# Patient Record
Sex: FEMALE | Race: WHITE | NOT HISPANIC OR LATINO
[De-identification: names, ages, dates, MRNs, and addresses within clinical notes are randomized per-mention and may not be internally consistent; named-entity substitution may affect disease eponyms.]

---

## 2018-08-10 ENCOUNTER — RESULT REVIEW (OUTPATIENT)
Age: 43
End: 2018-08-10

## 2018-12-21 PROBLEM — Z00.00 ENCOUNTER FOR PREVENTIVE HEALTH EXAMINATION: Status: ACTIVE | Noted: 2018-12-21

## 2021-01-20 ENCOUNTER — APPOINTMENT (OUTPATIENT)
Dept: OBGYN | Facility: CLINIC | Age: 46
End: 2021-01-20
Payer: COMMERCIAL

## 2021-01-20 VITALS
TEMPERATURE: 97.1 F | WEIGHT: 154 LBS | SYSTOLIC BLOOD PRESSURE: 100 MMHG | DIASTOLIC BLOOD PRESSURE: 70 MMHG | BODY MASS INDEX: 29.07 KG/M2 | HEIGHT: 61 IN

## 2021-01-20 DIAGNOSIS — Z78.9 OTHER SPECIFIED HEALTH STATUS: ICD-10-CM

## 2021-01-20 DIAGNOSIS — Z87.898 PERSONAL HISTORY OF OTHER SPECIFIED CONDITIONS: ICD-10-CM

## 2021-01-20 DIAGNOSIS — Z01.419 ENCOUNTER FOR GYNECOLOGICAL EXAMINATION (GENERAL) (ROUTINE) W/OUT ABNORMAL FINDINGS: ICD-10-CM

## 2021-01-20 DIAGNOSIS — Z80.3 FAMILY HISTORY OF MALIGNANT NEOPLASM OF BREAST: ICD-10-CM

## 2021-01-20 PROCEDURE — 99396 PREV VISIT EST AGE 40-64: CPT

## 2021-01-20 PROCEDURE — 99072 ADDL SUPL MATRL&STAF TM PHE: CPT

## 2021-01-20 RX ORDER — LAMOTRIGINE 150 MG/1
TABLET ORAL
Refills: 0 | Status: ACTIVE | COMMUNITY

## 2021-01-20 NOTE — PHYSICAL EXAM
[Appropriately responsive] : appropriately responsive [Alert] : alert [No Acute Distress] : no acute distress [No Lymphadenopathy] : no lymphadenopathy [Soft] : soft [Non-tender] : non-tender [Oriented x3] : oriented x3 [Non-distended] : non-distended [Examination Of The Breasts] : a normal appearance [No Discharge] : no discharge [No Masses] : no breast masses were palpable [Labia Majora] : normal [Labia Minora] : normal [No Bleeding] : There was no active vaginal bleeding [Normal] : normal [Normal Position] : in a normal position [Uterine Adnexae] : non-palpable [FreeTextEntry7] : 2  incision

## 2021-01-20 NOTE — REVIEW OF SYSTEMS
[Patient Intake Form Reviewed] : Patient intake form was reviewed [Negative] : Heme/Lymph [Fever] : no fever [Chills] : no chills [Dyspnea] : no dyspnea [Chest Pain] : no chest pain [Abdominal Pain] : no abdominal pain [Abn Vaginal bleeding] : no abnormal vaginal bleeding [Pelvic pain] : no pelvic pain [Headache] : no headache [Depression] : no depression

## 2021-01-20 NOTE — DISCUSSION/SUMMARY
[FreeTextEntry1] :   The benefits of adequate calcium intake and a daily multivitamin along with routine daily cardiovascular exercise were reviewed with the patient.\par   The patient was informed regarding the benefits of a screening colonoscopy.\par   The importance of safer-sex was discussed with the patient.\par We reviewed ASCCP/ACOG guidelines for pap smears.

## 2021-01-20 NOTE — HISTORY OF PRESENT ILLNESS
[Patient reported PAP Smear was normal] : Patient reported PAP Smear was normal [Menarche Age ____] : age at menarche was [unfilled] [Definite ___ (Date)] : the last menstrual period was [unfilled] [Irregular Cycle Intervals] : are  irregular [Monogamous (Male Partner)] : is monogamous with a male partner [Y] : Positive pregnancy history [Hot Flashes] : hot flashes [Night Sweats] : night sweats [Menarche Age: ____] : age at menarche was [unfilled] [No] : Patient does not have concerns regarding sex [Currently Active] : currently active [Men] : men [Vaginal] : vaginal [Yes] : Yes [Condoms] : Condoms [Patient refuses STI testing] : Patient refuses STI testing [TextBox_4] : Viki is a 45  LMP 11/15/20 who presents for annual exam. She is without complaints. Denies pelvic pain, abnormal bleeding, or vaginal discharge. Denies issues with bowel or bladder function. \par She has not had a period for a few months and is having some hot flashes. \par She is sexually active with 1 male partner (s). Denies pain with intercourse. She is sexually satisfied. \par Lives with family. Works as . Feels safe at home. Denies depression/abuse. \par Immunizations: Received flu shot. \par  [Mammogramdate] : 12/12/2018 [TextBox_19] : BR2 [BreastSonogramDate] : 12/12/2018 [TextBox_25] : BR2 [PapSmeardate] : 08/10/2018 [TextBox_31] : NEG [LMPDate] : 11/15/2020 [PGHxTotal] : 2 [[facility]] : 2 [[facility]] : 2 [FreeTextEntry1] : 11/15/2020

## 2021-10-01 ENCOUNTER — EMERGENCY (EMERGENCY)
Facility: HOSPITAL | Age: 46
LOS: 0 days | Discharge: HOME | End: 2021-10-01
Attending: EMERGENCY MEDICINE
Payer: COMMERCIAL

## 2021-10-01 VITALS
OXYGEN SATURATION: 96 % | SYSTOLIC BLOOD PRESSURE: 115 MMHG | HEART RATE: 80 BPM | WEIGHT: 130.07 LBS | RESPIRATION RATE: 17 BRPM | HEIGHT: 61 IN | DIASTOLIC BLOOD PRESSURE: 72 MMHG | TEMPERATURE: 97 F

## 2021-10-01 VITALS
DIASTOLIC BLOOD PRESSURE: 66 MMHG | SYSTOLIC BLOOD PRESSURE: 111 MMHG | RESPIRATION RATE: 18 BRPM | HEART RATE: 90 BPM | OXYGEN SATURATION: 98 %

## 2021-10-01 DIAGNOSIS — G40.909 EPILEPSY, UNSPECIFIED, NOT INTRACTABLE, WITHOUT STATUS EPILEPTICUS: ICD-10-CM

## 2021-10-01 DIAGNOSIS — R51.9 HEADACHE, UNSPECIFIED: ICD-10-CM

## 2021-10-01 DIAGNOSIS — Z20.822 CONTACT WITH AND (SUSPECTED) EXPOSURE TO COVID-19: ICD-10-CM

## 2021-10-01 DIAGNOSIS — R56.9 UNSPECIFIED CONVULSIONS: ICD-10-CM

## 2021-10-01 LAB
ALBUMIN SERPL ELPH-MCNC: 3.8 G/DL — SIGNIFICANT CHANGE UP (ref 3.3–5)
ALP SERPL-CCNC: 72 U/L — SIGNIFICANT CHANGE UP (ref 40–120)
ALT FLD-CCNC: 25 U/L — SIGNIFICANT CHANGE UP (ref 12–78)
AMMONIA BLD-MCNC: <10 UMOL/L — LOW (ref 11–32)
AMPHET UR-MCNC: NEGATIVE — SIGNIFICANT CHANGE UP
ANION GAP SERPL CALC-SCNC: 2 MMOL/L — LOW (ref 5–17)
AST SERPL-CCNC: 14 U/L — LOW (ref 15–37)
BARBITURATES UR SCN-MCNC: NEGATIVE — SIGNIFICANT CHANGE UP
BASOPHILS # BLD AUTO: 0.03 K/UL — SIGNIFICANT CHANGE UP (ref 0–0.2)
BASOPHILS NFR BLD AUTO: 0.4 % — SIGNIFICANT CHANGE UP (ref 0–2)
BENZODIAZ UR-MCNC: NEGATIVE — SIGNIFICANT CHANGE UP
BILIRUB SERPL-MCNC: 0.4 MG/DL — SIGNIFICANT CHANGE UP (ref 0.2–1.2)
BUN SERPL-MCNC: 19 MG/DL — SIGNIFICANT CHANGE UP (ref 7–23)
CALCIUM SERPL-MCNC: 8.4 MG/DL — LOW (ref 8.5–10.1)
CHLORIDE SERPL-SCNC: 107 MMOL/L — SIGNIFICANT CHANGE UP (ref 96–108)
CO2 SERPL-SCNC: 32 MMOL/L — HIGH (ref 22–31)
COCAINE METAB.OTHER UR-MCNC: NEGATIVE — SIGNIFICANT CHANGE UP
CREAT SERPL-MCNC: 0.81 MG/DL — SIGNIFICANT CHANGE UP (ref 0.5–1.3)
EOSINOPHIL # BLD AUTO: 0.05 K/UL — SIGNIFICANT CHANGE UP (ref 0–0.5)
EOSINOPHIL NFR BLD AUTO: 0.6 % — SIGNIFICANT CHANGE UP (ref 0–6)
ETHANOL SERPL-MCNC: <10 MG/DL — SIGNIFICANT CHANGE UP (ref 0–10)
GLUCOSE BLDC GLUCOMTR-MCNC: 94 MG/DL — SIGNIFICANT CHANGE UP (ref 70–99)
GLUCOSE SERPL-MCNC: 104 MG/DL — HIGH (ref 70–99)
HCG SERPL-ACNC: 1 MIU/ML — SIGNIFICANT CHANGE UP
HCT VFR BLD CALC: 38.1 % — SIGNIFICANT CHANGE UP (ref 34.5–45)
HGB BLD-MCNC: 12.7 G/DL — SIGNIFICANT CHANGE UP (ref 11.5–15.5)
IMM GRANULOCYTES NFR BLD AUTO: 0.3 % — SIGNIFICANT CHANGE UP (ref 0–1.5)
LACTATE SERPL-SCNC: 1.1 MMOL/L — SIGNIFICANT CHANGE UP (ref 0.7–2)
LYMPHOCYTES # BLD AUTO: 0.88 K/UL — LOW (ref 1–3.3)
LYMPHOCYTES # BLD AUTO: 11.2 % — LOW (ref 13–44)
MCHC RBC-ENTMCNC: 31.1 PG — SIGNIFICANT CHANGE UP (ref 27–34)
MCHC RBC-ENTMCNC: 33.3 GM/DL — SIGNIFICANT CHANGE UP (ref 32–36)
MCV RBC AUTO: 93.2 FL — SIGNIFICANT CHANGE UP (ref 80–100)
METHADONE UR-MCNC: NEGATIVE — SIGNIFICANT CHANGE UP
MONOCYTES # BLD AUTO: 0.38 K/UL — SIGNIFICANT CHANGE UP (ref 0–0.9)
MONOCYTES NFR BLD AUTO: 4.8 % — SIGNIFICANT CHANGE UP (ref 2–14)
NEUTROPHILS # BLD AUTO: 6.5 K/UL — SIGNIFICANT CHANGE UP (ref 1.8–7.4)
NEUTROPHILS NFR BLD AUTO: 82.7 % — HIGH (ref 43–77)
NRBC # BLD: 0 /100 WBCS — SIGNIFICANT CHANGE UP (ref 0–0)
OPIATES UR-MCNC: NEGATIVE — SIGNIFICANT CHANGE UP
PCP SPEC-MCNC: SIGNIFICANT CHANGE UP
PCP UR-MCNC: NEGATIVE — SIGNIFICANT CHANGE UP
PLATELET # BLD AUTO: 263 K/UL — SIGNIFICANT CHANGE UP (ref 150–400)
POTASSIUM SERPL-MCNC: 4 MMOL/L — SIGNIFICANT CHANGE UP (ref 3.5–5.3)
POTASSIUM SERPL-SCNC: 4 MMOL/L — SIGNIFICANT CHANGE UP (ref 3.5–5.3)
PROT SERPL-MCNC: 7.4 GM/DL — SIGNIFICANT CHANGE UP (ref 6–8.3)
RBC # BLD: 4.09 M/UL — SIGNIFICANT CHANGE UP (ref 3.8–5.2)
RBC # FLD: 13.1 % — SIGNIFICANT CHANGE UP (ref 10.3–14.5)
SODIUM SERPL-SCNC: 141 MMOL/L — SIGNIFICANT CHANGE UP (ref 135–145)
THC UR QL: NEGATIVE — SIGNIFICANT CHANGE UP
WBC # BLD: 7.86 K/UL — SIGNIFICANT CHANGE UP (ref 3.8–10.5)
WBC # FLD AUTO: 7.86 K/UL — SIGNIFICANT CHANGE UP (ref 3.8–10.5)

## 2021-10-01 PROCEDURE — 93010 ELECTROCARDIOGRAM REPORT: CPT

## 2021-10-01 PROCEDURE — 99285 EMERGENCY DEPT VISIT HI MDM: CPT

## 2021-10-01 RX ORDER — LAMOTRIGINE 25 MG/1
200 TABLET, ORALLY DISINTEGRATING ORAL ONCE
Refills: 0 | Status: COMPLETED | OUTPATIENT
Start: 2021-10-01 | End: 2021-10-01

## 2021-10-01 RX ORDER — ACETAMINOPHEN 500 MG
650 TABLET ORAL ONCE
Refills: 0 | Status: COMPLETED | OUTPATIENT
Start: 2021-10-01 | End: 2021-10-01

## 2021-10-01 RX ADMIN — LAMOTRIGINE 200 MILLIGRAM(S): 25 TABLET, ORALLY DISINTEGRATING ORAL at 23:54

## 2021-10-01 RX ADMIN — Medication 650 MILLIGRAM(S): at 23:54

## 2021-10-01 NOTE — ED PROVIDER NOTE - OBJECTIVE STATEMENT
46 yo F with seizure.  Pt. was on return trip on plane from [place] when she started to seize, tonic/clonic, whole body, lasted minutes.  Pt. came to ER thereafter, complaining of headache.  No other complaints.  Pt. is well now.  Notes that her last grand mal was 20 years ago, but she occasionally (a few times a year), has focal/partial seizures.  She missed a few doses of Lamictal while on vacation as well.   ROS: negative for fever, cough, chest pain, shortness of breath, abd pain, nausea, vomiting, diarrhea, rash, paresthesia, and weakness--all other systems reviewed are negative.   PMH: epilepsy; Meds: lamictal; SH: Denies smoking/drinking/drug use

## 2021-10-01 NOTE — ED ADULT NURSE NOTE - CHIEF COMPLAINT QUOTE
had 2 sz in the plane from [facility] on Lamictal 225mg at pm and 200mg in the Am fs 108

## 2021-10-01 NOTE — ED PROVIDER NOTE - CLINICAL SUMMARY MEDICAL DECISION MAKING FREE TEXT BOX
46 yo F with seizure, hx of seizures, back to baseline now, no head trauma, has meds at home, missed dosages  -lamictal, tylenol for headache, basic labs, ammonia, hcg, lactate, drug/etoh, sz precautions, monitor  -f/u results, reeval

## 2021-10-01 NOTE — ED ADULT TRIAGE NOTE - CHIEF COMPLAINT QUOTE
had 2 sz in the plane from [facility] on Lamictal 225mg at pm and 200mg in the Am fs 108

## 2021-10-01 NOTE — ED PROVIDER NOTE - CARE PROVIDER_API CALL
Julian Cordova)  Clinical Neurophysiology; Neurology  [address]  [place] [address]  Phone: [phone]  Fax: [phone]  Follow Up Time: 4-6 Days

## 2021-10-01 NOTE — ED ADULT NURSE NOTE - CHPI ED NUR SYMPTOMS NEG
no blurred vision/no change in level of consciousness/no fever/no loss of consciousness/no nausea/no numbness/no vomiting/no weakness

## 2021-10-01 NOTE — ED PROVIDER NOTE - PHYSICAL EXAMINATION
Vitals: WNL  Gen: AAOx3, NAD, sitting comfortably in stretcher  Head: ncat, perrla, eomi b/l  Neck: supple, no lymphadenopathy, no midline deviation  Heart: rrr, no m/r/g  Lungs: CTA b/l, no rales/ronchi/wheezes  Abd: soft, nontender, non-distended, no rebound or guarding  Ext: no clubbing/cyanosis/edema  Neuro: sensation and muscle strength intact b/l, steady gait, CN2-12 intact b/l, no focal weakness or sensory loss

## 2021-10-01 NOTE — ED ADULT NURSE NOTE - OBJECTIVE STATEMENT
Pt is a 44 yo F, AOx4 hx of seizure had 2 sz in the plane denies falling or hitting head, from [place] on Lamictal 225mg at pm and 200mg in the Am fs 108, denies pregnancy, denies allergy.

## 2021-10-01 NOTE — ED PROVIDER NOTE - PATIENT PORTAL LINK FT
You can access the FollowMyHealth Patient Portal offered by [facility] by registering at the following website: [email or web address]. By joining [facility]’s FollowMyHealth portal, you will also be able to view your health information using other applications (apps) compatible with our system.

## 2021-10-01 NOTE — ED PROVIDER NOTE - PROGRESS NOTE DETAILS
Results reported to patient--grossly benign, labs unremarkable   Pt. reports feeling better after meds, no sz activity in ER  pt. agrees to f/u with primary care outpt. as well as private neuro appt. already scheduled in 10 days   pt. understands to return to ED if symptoms worsen; will d/c; pt. has Lamictal at home, will resume normal schedule in AM

## 2021-10-02 LAB
APPEARANCE UR: ABNORMAL
BACTERIA # UR AUTO: ABNORMAL
BILIRUB UR-MCNC: NEGATIVE — SIGNIFICANT CHANGE UP
COLOR SPEC: YELLOW — SIGNIFICANT CHANGE UP
DIFF PNL FLD: ABNORMAL
EPI CELLS # UR: ABNORMAL
GLUCOSE UR QL: NEGATIVE MG/DL — SIGNIFICANT CHANGE UP
HYALINE CASTS # UR AUTO: ABNORMAL /LPF
KETONES UR-MCNC: ABNORMAL
LEUKOCYTE ESTERASE UR-ACNC: ABNORMAL
NITRITE UR-MCNC: NEGATIVE — SIGNIFICANT CHANGE UP
PH UR: 6 — SIGNIFICANT CHANGE UP (ref 5–8)
PROT UR-MCNC: 15 MG/DL
RAPID RVP RESULT: SIGNIFICANT CHANGE UP
RBC CASTS # UR COMP ASSIST: ABNORMAL /HPF (ref 0–4)
SARS-COV-2 RNA SPEC QL NAA+PROBE: SIGNIFICANT CHANGE UP
SP GR SPEC: 1.02 — SIGNIFICANT CHANGE UP (ref 1.01–1.02)
UROBILINOGEN FLD QL: NEGATIVE MG/DL — SIGNIFICANT CHANGE UP
WBC UR QL: ABNORMAL

## 2021-10-04 LAB
CULTURE RESULTS: SIGNIFICANT CHANGE UP
SPECIMEN SOURCE: SIGNIFICANT CHANGE UP

## 2021-10-05 LAB — LAMOTRIGINE SERPL-MCNC: 4.3 UG/ML — SIGNIFICANT CHANGE UP (ref 2–20)

## 2022-03-09 PROBLEM — R56.9 UNSPECIFIED CONVULSIONS: Chronic | Status: ACTIVE | Noted: 2021-10-01

## 2022-04-26 ENCOUNTER — NON-APPOINTMENT (OUTPATIENT)
Age: 47
End: 2022-04-26

## 2022-04-27 ENCOUNTER — APPOINTMENT (OUTPATIENT)
Dept: OBGYN | Facility: CLINIC | Age: 47
End: 2022-04-27
Payer: COMMERCIAL

## 2022-04-27 VITALS
BODY MASS INDEX: 26.06 KG/M2 | WEIGHT: 138 LBS | SYSTOLIC BLOOD PRESSURE: 118 MMHG | DIASTOLIC BLOOD PRESSURE: 82 MMHG | HEIGHT: 61 IN

## 2022-04-27 DIAGNOSIS — Z12.31 ENCOUNTER FOR SCREENING MAMMOGRAM FOR MALIGNANT NEOPLASM OF BREAST: ICD-10-CM

## 2022-04-27 DIAGNOSIS — R92.2 INCONCLUSIVE MAMMOGRAM: ICD-10-CM

## 2022-04-27 PROCEDURE — 82270 OCCULT BLOOD FECES: CPT

## 2022-04-27 PROCEDURE — 99396 PREV VISIT EST AGE 40-64: CPT

## 2022-04-28 LAB
DATE COLLECTED: NORMAL
HEMOCCULT SP1 STL QL: NEGATIVE
QUALITY CONTROL: YES

## 2022-04-29 LAB — HPV HIGH+LOW RISK DNA PNL CVX: NOT DETECTED

## 2022-04-29 NOTE — PHYSICAL EXAM
[Chaperone Present] : A chaperone was present in the examining room during all aspects of the physical examination [Appropriately responsive] : appropriately responsive [Alert] : alert [No Acute Distress] : no acute distress [Soft] : soft [Non-tender] : non-tender [Non-distended] : non-distended [Oriented x3] : oriented x3 [Examination Of The Breasts] : a normal appearance [No Discharge] : no discharge [No Masses] : no breast masses were palpable [Labia Majora] : normal [Labia Minora] : normal [No Bleeding] : There was no active vaginal bleeding [Normal] : normal [Uterine Adnexae] : normal [No Tenderness] : no tenderness [Nl Sphincter Tone] : normal sphincter tone [FreeTextEntry1] : Beauty sandee-present always per pt [FreeTextEntry9] : Guaiac negative

## 2022-04-29 NOTE — END OF VISIT
[FreeTextEntry3] : I, Ida Cooper, solely acted as a scribe for Dr. Bisi Avila on 04/27/2022 . All medical entries made by the Scribe were at my, Dr. Avila's, direction and personally dictated by me on 04/27/2022. I have reviewed the chart and agree that the record accurately reflects my personal performance of the history, physical exam, assessment and plan. I have also personally directed, reviewed and agreed with the chart.

## 2022-04-29 NOTE — DISCUSSION/SUMMARY
[FreeTextEntry1] : Pap done today. \par \par Prescription for mammogram screening and breast US given. \par \par Self-breast exam reviewed. \par \par The benefits of screening colonoscopy were reviewed in detail. Referred to colorectal specialist for baseline colonoscopy. \par \par She will follow up annually and as needed.

## 2022-04-29 NOTE — HISTORY OF PRESENT ILLNESS
[N] : Patient reports normal menses [Condoms] : uses condoms [Y] : Positive pregnancy history [Menarche Age: ____] : age at menarche was [unfilled] [Currently Active] : currently active [Mammogramdate] : 3/10/21 [TextBox_19] : BR2 [BreastSonogramDate] : 3/10/21 [TextBox_25] : BR2 [LMPDate] : 4/12/22 [PGHxTotal] : 2 [[facility]xFull[facility]] : 2 [[facility]xLiving] : 2 [FreeTextEntry1] : 4/12/22

## 2022-05-08 LAB — CYTOLOGY CVX/VAG DOC THIN PREP: ABNORMAL

## 2023-04-28 ENCOUNTER — APPOINTMENT (OUTPATIENT)
Dept: OBGYN | Facility: CLINIC | Age: 48
End: 2023-04-28
Payer: COMMERCIAL

## 2023-04-28 ENCOUNTER — NON-APPOINTMENT (OUTPATIENT)
Age: 48
End: 2023-04-28

## 2023-04-28 VITALS
WEIGHT: 148 LBS | HEIGHT: 61 IN | SYSTOLIC BLOOD PRESSURE: 126 MMHG | DIASTOLIC BLOOD PRESSURE: 66 MMHG | BODY MASS INDEX: 27.94 KG/M2

## 2023-04-28 PROCEDURE — 99396 PREV VISIT EST AGE 40-64: CPT

## 2023-04-28 PROCEDURE — 99212 OFFICE O/P EST SF 10 MIN: CPT | Mod: 25

## 2023-04-28 PROCEDURE — 36415 COLL VENOUS BLD VENIPUNCTURE: CPT

## 2023-04-28 NOTE — PHYSICAL EXAM
[Chaperone Present] : A chaperone was present in the examining room during all aspects of the physical examination [FreeTextEntry1] : MERE Skaggs [Appropriately responsive] : appropriately responsive [Alert] : alert [No Acute Distress] : no acute distress [Soft] : soft [Non-tender] : non-tender [Non-distended] : non-distended [No Mass] : no mass [Oriented x3] : oriented x3 [Examination Of The Breasts] : a normal appearance [No Masses] : no breast masses were palpable [Labia Majora] : normal [Labia Minora] : normal [Normal] : normal [Tenderness] : nontender [Uterine Adnexae] : non-palpable

## 2023-04-28 NOTE — HISTORY OF PRESENT ILLNESS
[HPV test offered] : HPV test offered [N] : Patient reports normal menses [Condoms] : uses condoms [Y] : Positive pregnancy history [Menarche Age: ____] : age at menarche was [unfilled] [Currently Active] : currently active [Men] : men [Mammogramdate] : 03/10/2021 [TextBox_19] : BR2 [BreastSonogramDate] : 03/10/2021 [TextBox_25] : BR2 [PapSmeardate] : 04/27/2022 [TextBox_31] : Negative [HPVDate] : 04/27/2022 [TextBox_78] : Negative [LMPDate] : 03/15/2023 [PGHxTotal] : 2 [[facility]] : 2 [[facility]xLiving] : 2 [FreeTextEntry1] : 03/15/2023

## 2023-04-28 NOTE — PLAN
[FreeTextEntry1] : -F/u pap and hormonal labs\par -Return for TVUS to investigate PMB, monitor for any new bleeding\par -Rx for screening mammogram/BUS provided, f/u results\par -Recommended dermatology for routine, annual skin survey\par -Screening colonoscopy recommended, contact information provided \par -Call or RTO PRN for any new problems, questions or concerns \par

## 2023-05-01 LAB — HPV HIGH+LOW RISK DNA PNL CVX: NOT DETECTED

## 2023-05-03 LAB
ESTRADIOL SERPL-MCNC: 59 PG/ML
FSH SERPL-MCNC: 48.5 IU/L
TSH SERPL-ACNC: 2.51 UIU/ML

## 2023-05-04 LAB — CYTOLOGY CVX/VAG DOC THIN PREP: NORMAL

## 2023-05-08 ENCOUNTER — APPOINTMENT (OUTPATIENT)
Dept: ANTEPARTUM | Facility: CLINIC | Age: 48
End: 2023-05-08
Payer: COMMERCIAL

## 2023-05-08 ENCOUNTER — APPOINTMENT (OUTPATIENT)
Dept: OBGYN | Facility: CLINIC | Age: 48
End: 2023-05-08
Payer: COMMERCIAL

## 2023-05-08 ENCOUNTER — ASOB RESULT (OUTPATIENT)
Age: 48
End: 2023-05-08

## 2023-05-08 VITALS
DIASTOLIC BLOOD PRESSURE: 67 MMHG | WEIGHT: 148 LBS | HEIGHT: 61 IN | BODY MASS INDEX: 27.94 KG/M2 | SYSTOLIC BLOOD PRESSURE: 110 MMHG

## 2023-05-08 PROCEDURE — 76830 TRANSVAGINAL US NON-OB: CPT

## 2023-05-08 PROCEDURE — 99213 OFFICE O/P EST LOW 20 MIN: CPT

## 2023-05-08 NOTE — PHYSICAL EXAM
[Chaperone Present] : A chaperone was present in the examining room during all aspects of the physical examination [Appropriately responsive] : appropriately responsive [Alert] : alert [No Acute Distress] : no acute distress [Oriented x3] : oriented x3 [Labia Majora] : normal [Labia Minora] : normal [Normal] : normal [Uterine Adnexae] : non-palpable [FreeTextEntry1] : MERE Renner [Tenderness] : nontender

## 2023-05-08 NOTE — HISTORY OF PRESENT ILLNESS
[N] : Patient reports normal menses [Y] : Positive pregnancy history [No] : Patient does not have concerns regarding sex [Condoms] : uses condoms [Menarche Age: ____] : age at menarche was [unfilled] [Mammogramdate] : 03/10/21 [TextBox_19] : BR2 [BreastSonogramDate] : 03/10/21 [TextBox_25] : BR2 [PapSmeardate] : 04/28/23 [TextBox_31] : NEG [HPVDate] : 04/28/23 [TextBox_78] : NEG [LMPDate] : 03/15/23 [PGHxTotal] : 4 [[facility]xFullTerm] : 2 [[facility]xLiving] : 2 [PGHxABSpont] : 2 [FreeTextEntry1] : 03/15/23

## 2023-05-08 NOTE — PLAN
[FreeTextEntry1] : -Reviewed TVUS results today, need for further investigation \par -She will return for Hysteroscopy/EMB to investigate possible polyp, endometrial sampling; advised Motrin with food prior to apt.\par -Discussed prior labs, indicative of menopause \par -Monitor for any continued bleeding \par -Call or RTO PRN for any new problems, questions or concerns

## 2023-06-06 ENCOUNTER — APPOINTMENT (OUTPATIENT)
Dept: OBGYN | Facility: CLINIC | Age: 48
End: 2023-06-06
Payer: COMMERCIAL

## 2023-06-06 VITALS
BODY MASS INDEX: 27.94 KG/M2 | WEIGHT: 148 LBS | HEIGHT: 61 IN | SYSTOLIC BLOOD PRESSURE: 120 MMHG | DIASTOLIC BLOOD PRESSURE: 68 MMHG

## 2023-06-06 DIAGNOSIS — N95.0 POSTMENOPAUSAL BLEEDING: ICD-10-CM

## 2023-06-06 DIAGNOSIS — Z12.11 ENCOUNTER FOR SCREENING FOR MALIGNANT NEOPLASM OF COLON: ICD-10-CM

## 2023-06-06 DIAGNOSIS — Z92.89 PERSONAL HISTORY OF OTHER MEDICAL TREATMENT: ICD-10-CM

## 2023-06-06 DIAGNOSIS — N84.0 POLYP OF CORPUS UTERI: ICD-10-CM

## 2023-06-06 DIAGNOSIS — Z01.411 ENCOUNTER FOR GYNECOLOGICAL EXAMINATION (GENERAL) (ROUTINE) WITH ABNORMAL FINDINGS: ICD-10-CM

## 2023-06-06 DIAGNOSIS — Z12.4 ENCOUNTER FOR SCREENING FOR MALIGNANT NEOPLASM OF CERVIX: ICD-10-CM

## 2023-06-06 DIAGNOSIS — Z01.419 ENCOUNTER FOR GYNECOLOGICAL EXAMINATION (GENERAL) (ROUTINE) W/OUT ABNORMAL FINDINGS: ICD-10-CM

## 2023-06-06 LAB
HCG UR QL: NEGATIVE
QUALITY CONTROL: YES

## 2023-06-06 PROCEDURE — 58558Z: CUSTOM

## 2023-06-06 PROCEDURE — 81025 URINE PREGNANCY TEST: CPT

## 2023-06-06 NOTE — HISTORY OF PRESENT ILLNESS
[HPV test offered] : HPV test offered [N] : Patient reports normal menses [Condoms] : uses condoms [Y] : Positive pregnancy history [Menarche Age: ____] : age at menarche was [unfilled] [Currently Active] : currently active [Men] : men [Mammogramdate] : 03/10/2021 [TextBox_19] : BR2 [BreastSonogramDate] : 03/10/2021 [TextBox_25] : BR2 [PapSmeardate] : 04/28/2023 [TextBox_31] : Negative [HPVDate] : 04/28/2023 [TextBox_78] : Negative [LMPDate] : 03/15/2022 [PGHxTotal] : 4 [[facility]xFullTerm] : 2 [[facility]Living] : 2 [PGHxABSpont] : 2 [FreeTextEntry1] : 03/15/2022

## 2023-06-06 NOTE — END OF VISIT
[FreeTextEntry3] : I, Gregory Murphy solely acted as scribe for Dr. Bisi Avila on 06/06/2023 \par All medical entries made by the Scribe were at my, Dr. Avila’s, direction and personally\par dictated by me on 06/06/2023 . I have reviewed the chart and agree that the record\par accurately reflects my personal performance of the history, physical exam, assessment and plan. I\par have also personally directed, reviewed, and agreed with the chart.

## 2023-06-29 ENCOUNTER — NON-APPOINTMENT (OUTPATIENT)
Age: 48
End: 2023-06-29

## 2023-07-03 LAB — CORE LAB BIOPSY: NORMAL

## 2023-07-31 NOTE — ED PROVIDER NOTE - PROVIDER TOKENS
PROVIDER:[TOKEN:[[id number]],FOLLOWUP:[4-6 Days]] Solaraze Pregnancy And Lactation Text: This medication is Pregnancy Category B and is considered safe. There is some data to suggest avoiding during the third trimester. It is unknown if this medication is excreted in breast milk.

## 2024-06-11 ENCOUNTER — NON-APPOINTMENT (OUTPATIENT)
Age: 49
End: 2024-06-11

## 2024-10-02 ENCOUNTER — APPOINTMENT (OUTPATIENT)
Dept: ORTHOPEDIC SURGERY | Facility: CLINIC | Age: 49
End: 2024-10-02

## 2024-10-02 VITALS — BODY MASS INDEX: 26.43 KG/M2 | WEIGHT: 140 LBS | HEIGHT: 61 IN

## 2024-10-02 DIAGNOSIS — M79.18 MYALGIA, OTHER SITE: ICD-10-CM

## 2024-10-02 DIAGNOSIS — M47.812 SPONDYLOSIS W/OUT MYELOPATHY OR RADICULOPATHY, CERVICAL REGION: ICD-10-CM

## 2024-10-02 DIAGNOSIS — M54.12 RADICULOPATHY, CERVICAL REGION: ICD-10-CM

## 2024-10-02 PROCEDURE — 99204 OFFICE O/P NEW MOD 45 MIN: CPT | Mod: 25

## 2024-10-02 PROCEDURE — 72040 X-RAY EXAM NECK SPINE 2-3 VW: CPT

## 2024-10-02 RX ORDER — METHYLPREDNISOLONE 4 MG/1
4 TABLET ORAL
Qty: 1 | Refills: 0 | Status: ACTIVE | COMMUNITY
Start: 2024-10-02 | End: 1900-01-01

## 2024-10-02 RX ORDER — METHOCARBAMOL 500 MG/1
500 TABLET, FILM COATED ORAL
Qty: 40 | Refills: 0 | Status: ACTIVE | COMMUNITY
Start: 2024-10-02 | End: 1900-01-01

## 2024-10-02 NOTE — IMAGING
[de-identified] : NECK:  Inspection: no ecchymosis.  Palpation: RIGHT trapezial tenderness and spasms sub occiput to right medial parascapular border.  Range of motion: Full range of motion with mild stiffness. Pain at extremes of rotation to RIGHT Strength Testing: No weakness with Finger Abductors and Grasp on the RIGHT Normal Deltoid, Biceps, Triceps, Wrist Flexors Neurological testing: light touch is intact throughout both upper extremities German reflex: neg Spurling and facet loading test positive  RIGHT SHOULDER   Inspection: No swelling. Mild Scapular Protraction. Palpation: No Tenderness is noted Range of motion: , ER 60, @90ER 90, @90IR 50 Strength: Forward Flexion 5/5. Abduction 5/5. External Rotation 5/5 and Internal Rotation 5/5 Neurological testing: motor and sensor intact distally. Ligament Stability and Special Tests: Shoulder apprehension: neg Shoulder relocation: neg Obriens test: neg Biceps Active test: neg Camargo Labral Shear: neg Impingement testing: neg Marcelo testing: neg Cross Body Adduction: neg

## 2024-10-02 NOTE — HISTORY OF PRESENT ILLNESS
[de-identified] : 10/02/2024: The patient is a 48-year-old F, RHD who presents today complaining of RIGHT posterior upper arm pain since July, now pain neck with radicular pain intermittently to her hand. Date of Injury/Onset:  July 2024 Pain:    At Rest: 7/10 With Activity:   7/10 Mechanism of injury: No specific cause of injury/trauma.  Quality of symptoms: Dull/aching with some numbness Improves with: Nothing helps with the pain, she tried Salanpas to her RUE, alternating taking Advil/Aleve Worse: At night  Prior treatment/ Imaging: None School/Sport/Position/Occupation:   Additional Information: History of seizures controlled

## 2024-10-02 NOTE — DISCUSSION/SUMMARY
[Medication Risks Reviewed] : Medication risks reviewed [de-identified] : Right / Bilateral / Left X-Ray Examination of the CERVICAL SPINE 3 views (or less) shows: straightening consistent with spasm and disc space narrowing.  Assessment:   The patient is a 48 year old female with physical exam findings consistent with CERVICAL RADICULOPATHY  - We discussed their diagnosis and treatment options at length including the risks and benefits of both surgical and nonsurgical options. - We will conservative treatment with a course of PT and anti-inflammatory medication. - Rx given for Medrol dose pack - If they do not make an appropriate improvement with therapy we discussed that we will obtain and MRI at their next visit.   Follow up: ***

## 2024-11-13 ENCOUNTER — APPOINTMENT (OUTPATIENT)
Dept: ORTHOPEDIC SURGERY | Facility: CLINIC | Age: 49
End: 2024-11-13

## 2025-01-02 ENCOUNTER — NON-APPOINTMENT (OUTPATIENT)
Age: 50
End: 2025-01-02

## 2025-03-10 ENCOUNTER — APPOINTMENT (OUTPATIENT)
Dept: OBGYN | Facility: CLINIC | Age: 50
End: 2025-03-10
Payer: COMMERCIAL

## 2025-03-10 VITALS
SYSTOLIC BLOOD PRESSURE: 110 MMHG | BODY MASS INDEX: 27.56 KG/M2 | DIASTOLIC BLOOD PRESSURE: 66 MMHG | WEIGHT: 146 LBS | HEIGHT: 61 IN

## 2025-03-10 DIAGNOSIS — Z01.419 ENCOUNTER FOR GYNECOLOGICAL EXAMINATION (GENERAL) (ROUTINE) W/OUT ABNORMAL FINDINGS: ICD-10-CM

## 2025-03-10 DIAGNOSIS — R92.30 DENSE BREASTS, UNSPECIFIED: ICD-10-CM

## 2025-03-10 DIAGNOSIS — N95.1 MENOPAUSAL AND FEMALE CLIMACTERIC STATES: ICD-10-CM

## 2025-03-10 DIAGNOSIS — Z12.4 ENCOUNTER FOR SCREENING FOR MALIGNANT NEOPLASM OF CERVIX: ICD-10-CM

## 2025-03-10 DIAGNOSIS — Z12.31 ENCOUNTER FOR SCREENING MAMMOGRAM FOR MALIGNANT NEOPLASM OF BREAST: ICD-10-CM

## 2025-03-10 PROCEDURE — 99459 PELVIC EXAMINATION: CPT

## 2025-03-10 PROCEDURE — 99396 PREV VISIT EST AGE 40-64: CPT

## 2025-03-10 RX ORDER — ESTRADIOL 10 UG/1
10 TABLET, FILM COATED VAGINAL
Qty: 2 | Refills: 3 | Status: ACTIVE | COMMUNITY
Start: 2025-03-10 | End: 1900-01-01

## 2025-03-12 LAB — HPV HIGH+LOW RISK DNA PNL CVX: NOT DETECTED

## 2025-03-17 LAB — CYTOLOGY CVX/VAG DOC THIN PREP: NORMAL

## 2025-04-25 ENCOUNTER — NON-APPOINTMENT (OUTPATIENT)
Age: 50
End: 2025-04-25